# Patient Record
Sex: MALE | Race: WHITE
[De-identification: names, ages, dates, MRNs, and addresses within clinical notes are randomized per-mention and may not be internally consistent; named-entity substitution may affect disease eponyms.]

---

## 2019-02-06 ENCOUNTER — HOSPITAL ENCOUNTER (INPATIENT)
Dept: HOSPITAL 63 - ER | Age: 59
LOS: 1 days | Discharge: ANOTHER HOSPITAL | DRG: 885 | End: 2019-02-07
Attending: PSYCHIATRY & NEUROLOGY | Admitting: PSYCHIATRY & NEUROLOGY
Payer: MEDICARE

## 2019-02-06 VITALS — DIASTOLIC BLOOD PRESSURE: 81 MMHG | SYSTOLIC BLOOD PRESSURE: 124 MMHG

## 2019-02-06 VITALS — WEIGHT: 262.8 LBS | BODY MASS INDEX: 32 KG/M2 | HEIGHT: 76 IN

## 2019-02-06 DIAGNOSIS — Z88.0: ICD-10-CM

## 2019-02-06 DIAGNOSIS — K21.9: ICD-10-CM

## 2019-02-06 DIAGNOSIS — Z79.899: ICD-10-CM

## 2019-02-06 DIAGNOSIS — F17.210: ICD-10-CM

## 2019-02-06 DIAGNOSIS — F31.64: Primary | ICD-10-CM

## 2019-02-06 DIAGNOSIS — D64.9: ICD-10-CM

## 2019-02-06 DIAGNOSIS — F03.90: ICD-10-CM

## 2019-02-06 DIAGNOSIS — G40.909: ICD-10-CM

## 2019-02-06 DIAGNOSIS — L02.31: ICD-10-CM

## 2019-02-06 DIAGNOSIS — Z86.73: ICD-10-CM

## 2019-02-06 DIAGNOSIS — I10: ICD-10-CM

## 2019-02-06 DIAGNOSIS — G47.30: ICD-10-CM

## 2019-02-06 DIAGNOSIS — F63.9: ICD-10-CM

## 2019-02-06 LAB
ALBUMIN SERPL-MCNC: 3.2 G/DL (ref 3.4–5)
ALBUMIN/GLOB SERPL: 0.7 {RATIO} (ref 1–1.7)
ALP SERPL-CCNC: 66 U/L (ref 46–116)
ALT SERPL-CCNC: 23 U/L (ref 16–63)
ANION GAP SERPL CALC-SCNC: 10 MMOL/L (ref 6–14)
APTT PPP: YELLOW S
AST SERPL-CCNC: 18 U/L (ref 15–37)
BACTERIA #/AREA URNS HPF: (no result) /HPF
BASOPHILS # BLD AUTO: 0 X10^3/UL (ref 0–0.2)
BASOPHILS NFR BLD: 1 % (ref 0–3)
BILIRUB SERPL-MCNC: 0.6 MG/DL (ref 0.2–1)
BILIRUB UR QL STRIP: (no result)
BUN/CREAT SERPL: 16 (ref 6–20)
CA-I SERPL ISE-MCNC: 14 MG/DL (ref 8–26)
CALCIUM SERPL-MCNC: 9.1 MG/DL (ref 8.5–10.1)
CHLORIDE SERPL-SCNC: 97 MMOL/L (ref 98–107)
CO2 SERPL-SCNC: 29 MMOL/L (ref 21–32)
CREAT SERPL-MCNC: 0.9 MG/DL (ref 0.7–1.3)
EOSINOPHIL NFR BLD: 0.1 X10^3/UL (ref 0–0.7)
EOSINOPHIL NFR BLD: 1 % (ref 0–3)
ERYTHROCYTE [DISTWIDTH] IN BLOOD BY AUTOMATED COUNT: 13.3 % (ref 11.5–14.5)
FIBRINOGEN PPP-MCNC: CLEAR MG/DL
GFR SERPLBLD BASED ON 1.73 SQ M-ARVRAT: 86.4 ML/MIN
GLOBULIN SER-MCNC: 4.4 G/DL (ref 2.2–3.8)
GLUCOSE SERPL-MCNC: 246 MG/DL (ref 70–99)
GLUCOSE UR STRIP-MCNC: 100 MG/DL
HCT VFR BLD CALC: 42.2 % (ref 39–53)
HGB BLD-MCNC: 14.3 G/DL (ref 13–17.5)
LYMPHOCYTES # BLD: 1.4 X10^3/UL (ref 1–4.8)
LYMPHOCYTES NFR BLD AUTO: 16 % (ref 24–48)
MAGNESIUM SERPL-MCNC: 1.9 MG/DL (ref 1.8–2.4)
MCH RBC QN AUTO: 33 PG (ref 25–35)
MCHC RBC AUTO-ENTMCNC: 34 G/DL (ref 31–37)
MCV RBC AUTO: 97 FL (ref 79–100)
MONO #: 1.5 X10^3/UL (ref 0–1.1)
MONOCYTES NFR BLD: 17 % (ref 0–9)
NEUT #: 5.8 X10^3UL (ref 1.8–7.7)
NEUTROPHILS NFR BLD AUTO: 66 % (ref 31–73)
NITRITE UR QL STRIP: (no result)
PLATELET # BLD AUTO: 180 X10^3/UL (ref 140–400)
POTASSIUM SERPL-SCNC: 4 MMOL/L (ref 3.5–5.1)
PROT SERPL-MCNC: 7.6 G/DL (ref 6.4–8.2)
RBC # BLD AUTO: 4.33 X10^6/UL (ref 4.3–5.7)
RBC #/AREA URNS HPF: 0 /HPF (ref 0–2)
SODIUM SERPL-SCNC: 136 MMOL/L (ref 136–145)
SP GR UR STRIP: 1.01
SQUAMOUS #/AREA URNS LPF: (no result) /LPF
UROBILINOGEN UR-MCNC: 4 MG/DL
VAL ACID: 85 MCG/ML (ref 50–100)
WBC # BLD AUTO: 8.9 X10^3/UL (ref 4–11)
WBC #/AREA URNS HPF: (no result) /HPF (ref 0–4)

## 2019-02-06 PROCEDURE — 80164 ASSAY DIPROPYLACETIC ACD TOT: CPT

## 2019-02-06 PROCEDURE — 84436 ASSAY OF TOTAL THYROXINE: CPT

## 2019-02-06 PROCEDURE — 83540 ASSAY OF IRON: CPT

## 2019-02-06 PROCEDURE — 82306 VITAMIN D 25 HYDROXY: CPT

## 2019-02-06 PROCEDURE — 81001 URINALYSIS AUTO W/SCOPE: CPT

## 2019-02-06 PROCEDURE — 87075 CULTR BACTERIA EXCEPT BLOOD: CPT

## 2019-02-06 PROCEDURE — 83550 IRON BINDING TEST: CPT

## 2019-02-06 PROCEDURE — 84480 ASSAY TRIIODOTHYRONINE (T3): CPT

## 2019-02-06 PROCEDURE — 86592 SYPHILIS TEST NON-TREP QUAL: CPT

## 2019-02-06 PROCEDURE — 80053 COMPREHEN METABOLIC PANEL: CPT

## 2019-02-06 PROCEDURE — 93005 ELECTROCARDIOGRAM TRACING: CPT

## 2019-02-06 PROCEDURE — 83036 HEMOGLOBIN GLYCOSYLATED A1C: CPT

## 2019-02-06 PROCEDURE — 84443 ASSAY THYROID STIM HORMONE: CPT

## 2019-02-06 PROCEDURE — 87186 SC STD MICRODIL/AGAR DIL: CPT

## 2019-02-06 PROCEDURE — 83735 ASSAY OF MAGNESIUM: CPT

## 2019-02-06 PROCEDURE — 36415 COLL VENOUS BLD VENIPUNCTURE: CPT

## 2019-02-06 PROCEDURE — 85025 COMPLETE CBC W/AUTO DIFF WBC: CPT

## 2019-02-06 PROCEDURE — 87071 CULTURE AEROBIC QUANT OTHER: CPT

## 2019-02-06 PROCEDURE — 80061 LIPID PANEL: CPT

## 2019-02-06 PROCEDURE — 99406 BEHAV CHNG SMOKING 3-10 MIN: CPT
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
## 2019-02-06 NOTE — EKG
[facility] [address], [place] [address]

Test Date:    2019               Test Time:    17:54:49

Pat Name:     CALEB TILLEY            Department:   

Patient ID:   [id number]           Room:          

Gender:       M                        Technician:   

:          [id number]               Requested By: ANDRZEJ ARIAS

Order Number: [id number]            Reading MD:   Sebas Kapadia MD

                                 Measurements

Intervals                              Axis          

Rate:         74                       P:            

FL:                                    QRS:          48

QRSD:         102                      T:            51

QT:           380                                    

QTc:          427                                    

                           Interpretive Statements

SR

NON-SPECIFIC ST/T CHANGES

Electronically Signed On 2019 9:27:04 CST by Sebas Kapadia MD

## 2019-02-06 NOTE — PDOC
Exam


Note:


Abilio Note:


Please also refer to the separate dictated note~for this date of service 

dictated separately. Discussed the patient with Nursing staff reviewed the 

chart.~Reviewed interim history and current functioning. Reviewed vital signs,~

Labs/ Radiology~and current medications noted below. Continue current treatment 

with the changes noted in the dictated addendum note





Assessment:


Vital Signs:





 Vital Signs








  Date Time  Temp Pulse Resp B/P (MAP) Pulse Ox O2 Delivery O2 Flow Rate FiO2


 


2/6/19 21:19 97.6 73 20 124/81 (95) 95   


 


2/6/19 18:04      Room Air  








Labs:





 Laboratory Tests








Test


 2/6/19


17:59 2/6/19


18:54


 


White Blood Count


 8.9 x10^3/uL


(4.0-11.0) 





 


Red Blood Count


 4.33 x10^6/uL


(4.30-5.70) 





 


Hemoglobin


 14.3 g/dL


(13.0-17.5) 





 


Hematocrit


 42.2 %


(39.0-53.0) 





 


Mean Corpuscular Volume


 97 fL ()


 





 


Mean Corpuscular Hemoglobin 33 pg (25-35)   


 


Mean Corpuscular Hemoglobin


Concent 34 g/dL


(31-37) 





 


Red Cell Distribution Width


 13.3 %


(11.5-14.5) 





 


Platelet Count


 180 x10^3/uL


(140-400) 





 


Neutrophils (%) (Auto) 66 % (31-73)   


 


Lymphocytes (%) (Auto) 16 % (24-48)  L 


 


Monocytes (%) (Auto) 17 % (0-9)  H 


 


Eosinophils (%) (Auto) 1 % (0-3)   


 


Basophils (%) (Auto) 1 % (0-3)   


 


Neutrophils # (Auto)


 5.8 x10^3uL


(1.8-7.7) 





 


Lymphocytes # (Auto)


 1.4 x10^3/uL


(1.0-4.8) 





 


Monocytes # (Auto)


 1.5 x10^3/uL


(0.0-1.1)  H 





 


Eosinophils # (Auto)


 0.1 x10^3/uL


(0.0-0.7) 





 


Basophils # (Auto)


 0.0 x10^3/uL


(0.0-0.2) 





 


Sodium Level


 136 mmol/L


(136-145) 





 


Potassium Level


 4.0 mmol/L


(3.5-5.1) 





 


Chloride Level


 97 mmol/L


()  L 





 


Carbon Dioxide Level


 29 mmol/L


(21-32) 





 


Anion Gap 10 (6-14)   


 


Blood Urea Nitrogen


 14 mg/dL


(8-26) 





 


Creatinine


 0.9 mg/dL


(0.7-1.3) 





 


Estimated GFR


(Cockcroft-Gault) 86.4  


 





 


BUN/Creatinine Ratio 16 (6-20)   


 


Glucose Level


 246 mg/dL


(70-99)  H 





 


Calcium Level


 9.1 mg/dL


(8.5-10.1) 





 


Magnesium Level


 1.9 mg/dL


(1.8-2.4) 





 


Total Bilirubin


 0.6 mg/dL


(0.2-1.0) 





 


Aspartate Amino Transferase


(AST) 18 U/L (15-37)


 





 


Alanine Aminotransferase (ALT)


 23 U/L (16-63)


 





 


Alkaline Phosphatase


 66 U/L


() 





 


Total Protein


 7.6 g/dL


(6.4-8.2) 





 


Albumin


 3.2 g/dL


(3.4-5.0)  L 





 


Albumin/Globulin Ratio


 0.7 (1.0-1.7)


L 





 


Valproic Acid Level


 85 mcg/mL


() 





 


Valproic Acid Last Dose Date 02/05/2019   


 


Valproic Acid Last Dose Time 2100   


 


Urine Collection Type  Unknown  


 


Urine Color  Yellow  


 


Urine Clarity  Clear  


 


Urine pH  6.5  


 


Urine Specific Gravity  1.015  


 


Urine Protein


 


 Trace


(NEG-TRACE)


 


Urine Glucose (UA)


 


 100 mg/dL


(NEG)


 


Urine Ketones (Stick)


 


 Trace mg/dL


(NEG)


 


Urine Blood  Neg (NEG)  


 


Urine Nitrite  Neg (NEG)  


 


Urine Bilirubin  Neg (NEG)  


 


Urine Urobilinogen Dipstick


 


 4 mg/dL (0.2


mg/dL)


 


Urine Leukocyte Esterase  Neg (NEG)  


 


Urine RBC  0 /HPF (0-2)  


 


Urine WBC


 


 Occ /HPF (0-4)





 


Urine Squamous Epithelial


Cells 


 None /LPF  





 


Urine Bacteria


 


 Few /HPF


(0-FEW)











Current Medications:


Meds:





Current Medications


Acetaminophen (Tylenol) 650 mg PRN Q6HRS  PRN PO PAIN / TEMP;  Start 2/6/19 at 

21:15


Multi-Ingredient Ointment (Analgesic Balm) 1 marbella PRN QID  PRN TP MUSCLE PAIN;  

Start 2/6/19 at 21:15


Al Hydroxide/Mg Hydroxide (Mylanta Plus Xs) 15 ml PRN AFTMEALHC  PRN PO 

DYSPEPSIA;  Start 2/6/19 at 21:15


Magnesium Hydroxide (Milk Of Magnesia) 2,400 mg PRN QHS  PRN PO CONSTIPATION;  

Start 2/6/19 at 21:15


I have reviewed the current psychotropics carefully including drug 

interactions.  Risk benefit ratio favors no change other than as noted in my 

dictated progress note.





Diagnosis:


Problems:  


(1) Encounter for behavioral health screening











MOHAMUD ELLISON MD Feb 6, 2019 22:29

## 2019-02-07 VITALS — DIASTOLIC BLOOD PRESSURE: 71 MMHG | SYSTOLIC BLOOD PRESSURE: 113 MMHG

## 2019-02-07 VITALS — SYSTOLIC BLOOD PRESSURE: 90 MMHG | DIASTOLIC BLOOD PRESSURE: 58 MMHG

## 2019-02-07 VITALS — SYSTOLIC BLOOD PRESSURE: 114 MMHG | DIASTOLIC BLOOD PRESSURE: 73 MMHG

## 2019-02-07 LAB
CHOLEST/HDLC SERPL: 4 {RATIO}
HBA1C MFR BLD: 8.2 % (ref 4.8–5.6)
HDLC SERPL-MCNC: 26 MG/DL (ref 40–60)
LDLC: 74 MG/DL (ref 0–100)
T3 SERPL-MCNC: 100 NG/DL (ref 71–180)
T4 SERPL-MCNC: 6.4 UG/DL (ref 4.5–12)
THYROID STIM HORMONE (TSH): 2.46 UIU/ML (ref 0.36–3.74)
TRIGL SERPL-MCNC: 95 MG/DL (ref 0–150)
VLDLC: 19 MG/DL (ref 0–40)

## 2019-02-07 NOTE — PDOC
Exam


Note:


Abilio Note:


Please also refer to the separate dictated note~for this date of service 

dictated separately.~ Discussed the patient with Nursing staff reviewed the 

chart.~Reviewed interim history and current functioning. Reviewed vital signs,~

Labs/ Radiology~and current medications noted below. Continue current treatment 

with the changes noted in the dictated addendum note





Assessment:


Vital Signs:





 Vital Signs








  Date Time  Temp Pulse Resp B/P (MAP) Pulse Ox O2 Delivery O2 Flow Rate FiO2


 


2/7/19 16:43 98.0 71 20 113/71 (85) 97   


 


2/7/19 06:55      Room Air  








I&O











Intake and Output 


 


 2/7/19





 07:01


 


Intake Total 120 ml


 


Balance 120 ml


 


 


 


Intake Oral 120 ml








Labs:





 Laboratory Tests








Test


 2/6/19


17:59 2/6/19


18:54


 


White Blood Count


 8.9 x10^3/uL


(4.0-11.0) 





 


Red Blood Count


 4.33 x10^6/uL


(4.30-5.70) 





 


Hemoglobin


 14.3 g/dL


(13.0-17.5) 





 


Hematocrit


 42.2 %


(39.0-53.0) 





 


Mean Corpuscular Volume


 97 fL ()


 





 


Mean Corpuscular Hemoglobin 33 pg (25-35)   


 


Mean Corpuscular Hemoglobin


Concent 34 g/dL


(31-37) 





 


Red Cell Distribution Width


 13.3 %


(11.5-14.5) 





 


Platelet Count


 180 x10^3/uL


(140-400) 





 


Neutrophils (%) (Auto) 66 % (31-73)   


 


Lymphocytes (%) (Auto) 16 % (24-48)  L 


 


Monocytes (%) (Auto) 17 % (0-9)  H 


 


Eosinophils (%) (Auto) 1 % (0-3)   


 


Basophils (%) (Auto) 1 % (0-3)   


 


Neutrophils # (Auto)


 5.8 x10^3uL


(1.8-7.7) 





 


Lymphocytes # (Auto)


 1.4 x10^3/uL


(1.0-4.8) 





 


Monocytes # (Auto)


 1.5 x10^3/uL


(0.0-1.1)  H 





 


Eosinophils # (Auto)


 0.1 x10^3/uL


(0.0-0.7) 





 


Basophils # (Auto)


 0.0 x10^3/uL


(0.0-0.2) 





 


Sodium Level


 136 mmol/L


(136-145) 





 


Potassium Level


 4.0 mmol/L


(3.5-5.1) 





 


Chloride Level


 97 mmol/L


()  L 





 


Carbon Dioxide Level


 29 mmol/L


(21-32) 





 


Anion Gap 10 (6-14)   


 


Blood Urea Nitrogen


 14 mg/dL


(8-26) 





 


Creatinine


 0.9 mg/dL


(0.7-1.3) 





 


Estimated GFR


(Cockcroft-Gault) 86.4  


 





 


BUN/Creatinine Ratio 16 (6-20)   


 


Glucose Level


 246 mg/dL


(70-99)  H 





 


Calcium Level


 9.1 mg/dL


(8.5-10.1) 





 


Magnesium Level


 1.9 mg/dL


(1.8-2.4) 





 


Iron Level


 26 ug/dL


()  L 





 


Total Iron Binding Capacity


 223 ug/dL


(250-450)  L 





 


Iron Saturation 12 % (15-34)  L 


 


Total Bilirubin


 0.6 mg/dL


(0.2-1.0) 





 


Aspartate Amino Transferase


(AST) 18 U/L (15-37)


 





 


Alanine Aminotransferase (ALT)


 23 U/L (16-63)


 





 


Alkaline Phosphatase


 66 U/L


() 





 


Total Protein


 7.6 g/dL


(6.4-8.2) 





 


Albumin


 3.2 g/dL


(3.4-5.0)  L 





 


Albumin/Globulin Ratio


 0.7 (1.0-1.7)


L 





 


Triglycerides Level


 95 mg/dL


(0-150) 





 


Cholesterol Level


 119 mg/dL


(0-200) 





 


LDL Cholesterol, Calculated


 74 mg/dL


(0-100) 





 


VLDL Cholesterol, Calculated


 19 mg/dL


(0-40) 





 


Non-HDL Cholesterol Calculated


 93 mg/dL


(0-129) 





 


HDL Cholesterol


 26 mg/dL


(40-60)  L 





 


Cholesterol/HDL Ratio 4.0   


 


25-Hydroxy Vitamin D Total


 19.9 ng/mL


()  L 





 


Thyroid Stimulating Hormone


(TSH) 2.463 uIU/mL


(0.358-3.740) 





 


Thyroxine (T4)


 6.4 ug/dL


(4.5-12.0) 





 


Total Triiodothyronine (TT3)


 100 ng/dL


() 





 


Valproic Acid Level


 85 mcg/mL


() 





 


Valproic Acid Last Dose Date 02/05/2019   


 


Valproic Acid Last Dose Time 2100   


 


Treponema pallidum Antibody


 Nonreactive


(Nonreactive) 





 


Urine Collection Type  Unknown  


 


Urine Color  Yellow  


 


Urine Clarity  Clear  


 


Urine pH  6.5  


 


Urine Specific Gravity  1.015  


 


Urine Protein


 


 Trace


(NEG-TRACE)


 


Urine Glucose (UA)


 


 100 mg/dL


(NEG)


 


Urine Ketones (Stick)


 


 Trace mg/dL


(NEG)


 


Urine Blood  Neg (NEG)  


 


Urine Nitrite  Neg (NEG)  


 


Urine Bilirubin  Neg (NEG)  


 


Urine Urobilinogen Dipstick


 


 4 mg/dL (0.2


mg/dL)


 


Urine Leukocyte Esterase  Neg (NEG)  


 


Urine RBC  0 /HPF (0-2)  


 


Urine WBC


 


 Occ /HPF (0-4)





 


Urine Squamous Epithelial


Cells 


 None /LPF  





 


Urine Bacteria


 


 Few /HPF


(0-FEW)











Current Medications:


Meds:





Current Medications


Acetaminophen (Tylenol) 650 mg PRN Q6HRS  PRN PO PAIN / TEMP;  Start 2/6/19 at 

21:15;  Stop 2/7/19 at 16:58;  Status DC


Multi-Ingredient Ointment (Analgesic Balm) 1 marbella PRN QID  PRN TP MUSCLE PAIN;  

Start 2/6/19 at 21:15;  Stop 2/7/19 at 16:58;  Status DC


Al Hydroxide/Mg Hydroxide (Mylanta Plus Xs) 15 ml PRN AFTMEALHC  PRN PO 

DYSPEPSIA;  Start 2/6/19 at 21:15;  Stop 2/7/19 at 16:58;  Status DC


Magnesium Hydroxide (Milk Of Magnesia) 2,400 mg PRN QHS  PRN PO CONSTIPATION;  

Start 2/6/19 at 21:15;  Stop 2/7/19 at 16:58;  Status DC


Aspirin (Dino Aspirin) 325 mg DAILYWBKFT PO  Last administered on 2/7/19at 10:

07;  Start 2/7/19 at 08:00;  Stop 2/7/19 at 16:58;  Status DC


Metoprolol Tartrate (Lopressor) 6.25 mg BID@0900,1700 PO  Last administered on 2 /7/19at 10:06;  Start 2/7/19 at 09:00;  Stop 2/7/19 at 16:58;  Status DC


Atorvastatin Calcium (Lipitor) 40 mg QHS PO ;  Start 2/7/19 at 21:00;  Stop 2/7/ 19 at 21:00;  Status DC


Famotidine (Pepcid) 20 mg QHS PO ;  Start 2/7/19 at 21:00;  Stop 2/7/19 at 21:00

;  Status DC


Levetiracetam (Keppra) 1,500 mg BID@0900,1700 PO  Last administered on 2/7/19at 

10:06;  Start 2/7/19 at 09:00;  Stop 2/7/19 at 16:58;  Status DC


Sodium Chloride (Saline Mist Nasal) 1 marbella PRN QID  PRN NS NASAL CONGESTION;  

Start 2/6/19 at 23:00;  Stop 2/7/19 at 16:58;  Status DC


Divalproex Sodium (Depakote Er) 250 mg QHS PO  Last administered on 2/7/19at 00:

07;  Start 2/7/19 at 00:00;  Stop 2/7/19 at 16:58;  Status DC


Divalproex Sodium (Depakote Er) 1,500 mg QHS PO  Last administered on 2/7/19at 

00:07;  Start 2/7/19 at 00:00;  Stop 2/7/19 at 16:58;  Status DC


Paroxetine HCl (Paxil) 10 mg DAILY@0900 PO  Last administered on 2/7/19at 10:07

;  Start 2/7/19 at 09:00;  Stop 2/7/19 at 16:58;  Status DC


Non-Formulary Medication (Quetiapine Fumarate (Seroquel Xr)) 400 mg 2100 PO ;  

Start 2/7/19 at 21:00;  Stop 2/7/19 at 21:00;  Status DC


Quetiapine Fumarate (SEROquel) 75 mg BID@0900,1700 PO  Last administered on 2/7/ 19at 10:06;  Start 2/7/19 at 09:00;  Stop 2/7/19 at 16:58;  Status DC


Trazodone HCl (Desyrel) 75 mg QHS PO ;  Start 2/7/19 at 21:00;  Stop 2/7/19 at 

21:00;  Status DC


Nicotine (Nicoderm Cq 14mg) 1 patch DAILY TD  Last administered on 2/7/19at 10:

07;  Start 2/7/19 at 09:00;  Stop 2/7/19 at 16:58;  Status DC





Active Scripts


Active


Reported


NICODERM CQ 14mg (Nicotine) 1 Each Patch.td24 1 Patch TD DAILY


Analgesic Balm (Methyl Salicylate/Menthol) 28 Gm Oint...g. 1 Gm TP PRN QID PRN


Milk Of Magnesia (Magnesium Hydroxide) 2,400 Mg/10 Ml Oral.susp 2,400 Mg PO PRN 

QHS PRN


Mag-Al Plus Suspension (Mag Hydrox/Al Hydrox/Simeth) 30 Ml Oral.susp 15 Ml PO 

PRN AFTMEALHC PRN


Tylenol (Acetaminophen) 325 Mg Tablet 650 Mg PO PRN Q6HRS PRN


Deep Sea (Sodium Chloride) 44 Ml Spray 1 Spray NS PRN QID PRN


Trazodone Hcl 150 Mg Tablet 75 Mg PO QHS


Levetiracetam 500 Mg Tablet 500 Mg PO BID@0900,1700


Seroquel Xr (Quetiapine Fumarate) 400 Mg Tab.er.24h 400 Mg PO 2100


Seroquel (Quetiapine Fumarate) 25 Mg Tablet 75 Mg PO BID@0900,1700


Paxil (Paroxetine Hcl) 10 Mg Tablet 10 Mg PO DAILY@0900


Metoprolol Tartrate 25 Mg Tablet 6.25 Mg PO BID@0900,1700


Famotidine 20 Mg Tablet 20 Mg PO QHS


Divalproex Sodium Er (Divalproex Sodium) 500 Mg Tab.er.24h 1,500 Mg PO QHS


Depakote Er (Divalproex Sodium) 250 Mg Tab.er.24h 250 Mg PO QHS


Atorvastatin Calcium 40 Mg Tablet 40 Mg PO QHS


Aspirin 325 Mg Tablet 325 Mg PO DAILY


I have reviewed the current psychotropics carefully including drug 

interactions.  Risk benefit ratio favors no change other than as noted in my 

dictated progress note.





Diagnosis:


Problems:  


(1) Bipolar affective, mixed











MOHAMUD ELLISON MD Feb 7, 2019 17:57
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
